# Patient Record
Sex: MALE | Race: WHITE | NOT HISPANIC OR LATINO | Employment: FULL TIME | URBAN - METROPOLITAN AREA
[De-identification: names, ages, dates, MRNs, and addresses within clinical notes are randomized per-mention and may not be internally consistent; named-entity substitution may affect disease eponyms.]

---

## 2019-07-21 ENCOUNTER — HOSPITAL ENCOUNTER (EMERGENCY)
Facility: HOSPITAL | Age: 29
Discharge: HOME | End: 2019-07-21
Attending: EMERGENCY MEDICINE

## 2019-07-21 VITALS
HEART RATE: 73 BPM | DIASTOLIC BLOOD PRESSURE: 61 MMHG | TEMPERATURE: 98 F | OXYGEN SATURATION: 99 % | HEIGHT: 68 IN | RESPIRATION RATE: 19 BRPM | SYSTOLIC BLOOD PRESSURE: 123 MMHG | WEIGHT: 183.88 LBS | BODY MASS INDEX: 27.87 KG/M2

## 2019-07-21 DIAGNOSIS — R25.2 MUSCLE CRAMPS: ICD-10-CM

## 2019-07-21 DIAGNOSIS — T67.8XXA HEAT STRESS SYNDROME, INITIAL ENCOUNTER: Primary | ICD-10-CM

## 2019-07-21 LAB
ALBUMIN SERPL BCP-MCNC: 4.3 G/DL (ref 3.5–5.2)
ALP SERPL-CCNC: 72 U/L (ref 55–135)
ALT SERPL W/O P-5'-P-CCNC: 30 U/L (ref 10–44)
ANION GAP SERPL CALC-SCNC: 11 MMOL/L (ref 8–16)
AST SERPL-CCNC: 47 U/L (ref 10–40)
BASOPHILS # BLD AUTO: 0.04 K/UL (ref 0–0.2)
BASOPHILS NFR BLD: 0.7 % (ref 0–1.9)
BILIRUB SERPL-MCNC: 0.4 MG/DL (ref 0.1–1)
BILIRUB UR QL STRIP: NEGATIVE
BUN SERPL-MCNC: 14 MG/DL (ref 6–20)
CALCIUM SERPL-MCNC: 9.8 MG/DL (ref 8.7–10.5)
CHLORIDE SERPL-SCNC: 103 MMOL/L (ref 95–110)
CK SERPL-CCNC: 1805 U/L (ref 20–200)
CLARITY UR: CLEAR
CO2 SERPL-SCNC: 27 MMOL/L (ref 23–29)
COLOR UR: YELLOW
CREAT SERPL-MCNC: 1.2 MG/DL (ref 0.5–1.4)
DIFFERENTIAL METHOD: ABNORMAL
EOSINOPHIL # BLD AUTO: 0.3 K/UL (ref 0–0.5)
EOSINOPHIL NFR BLD: 4.3 % (ref 0–8)
ERYTHROCYTE [DISTWIDTH] IN BLOOD BY AUTOMATED COUNT: 13.6 % (ref 11.5–14.5)
EST. GFR  (AFRICAN AMERICAN): >60 ML/MIN/1.73 M^2
EST. GFR  (NON AFRICAN AMERICAN): >60 ML/MIN/1.73 M^2
GLUCOSE SERPL-MCNC: 86 MG/DL (ref 70–110)
GLUCOSE UR QL STRIP: NEGATIVE
HCT VFR BLD AUTO: 46 % (ref 40–54)
HGB BLD-MCNC: 16.4 G/DL (ref 14–18)
HGB UR QL STRIP: ABNORMAL
KETONES UR QL STRIP: NEGATIVE
LEUKOCYTE ESTERASE UR QL STRIP: NEGATIVE
LYMPHOCYTES # BLD AUTO: 2.6 K/UL (ref 1–4.8)
LYMPHOCYTES NFR BLD: 44.9 % (ref 18–48)
MAGNESIUM SERPL-MCNC: 2.2 MG/DL (ref 1.6–2.6)
MCH RBC QN AUTO: 29.9 PG (ref 27–31)
MCHC RBC AUTO-ENTMCNC: 35.7 G/DL (ref 32–36)
MCV RBC AUTO: 84 FL (ref 82–98)
MONOCYTES # BLD AUTO: 0.9 K/UL (ref 0.3–1)
MONOCYTES NFR BLD: 16 % (ref 4–15)
NEUTROPHILS # BLD AUTO: 2 K/UL (ref 1.8–7.7)
NEUTROPHILS NFR BLD: 34.3 % (ref 38–73)
NITRITE UR QL STRIP: NEGATIVE
PH UR STRIP: 6 [PH] (ref 5–8)
PLATELET # BLD AUTO: 309 K/UL (ref 150–350)
PMV BLD AUTO: 9.3 FL (ref 9.2–12.9)
POTASSIUM SERPL-SCNC: 4.2 MMOL/L (ref 3.5–5.1)
PROT SERPL-MCNC: 7.9 G/DL (ref 6–8.4)
PROT UR QL STRIP: NEGATIVE
RBC # BLD AUTO: 5.48 M/UL (ref 4.6–6.2)
SODIUM SERPL-SCNC: 141 MMOL/L (ref 136–145)
SP GR UR STRIP: 1.02 (ref 1–1.03)
URN SPEC COLLECT METH UR: ABNORMAL
UROBILINOGEN UR STRIP-ACNC: NEGATIVE EU/DL
WBC # BLD AUTO: 5.75 K/UL (ref 3.9–12.7)

## 2019-07-21 PROCEDURE — 96361 HYDRATE IV INFUSION ADD-ON: CPT

## 2019-07-21 PROCEDURE — 25000003 PHARM REV CODE 250: Performed by: EMERGENCY MEDICINE

## 2019-07-21 PROCEDURE — 83735 ASSAY OF MAGNESIUM: CPT

## 2019-07-21 PROCEDURE — 85025 COMPLETE CBC W/AUTO DIFF WBC: CPT

## 2019-07-21 PROCEDURE — 80053 COMPREHEN METABOLIC PANEL: CPT

## 2019-07-21 PROCEDURE — 81003 URINALYSIS AUTO W/O SCOPE: CPT

## 2019-07-21 PROCEDURE — 99284 EMERGENCY DEPT VISIT MOD MDM: CPT | Mod: 25

## 2019-07-21 PROCEDURE — 82550 ASSAY OF CK (CPK): CPT

## 2019-07-21 PROCEDURE — 96360 HYDRATION IV INFUSION INIT: CPT

## 2019-07-21 RX ORDER — DIAZEPAM 5 MG/1
5 TABLET ORAL
Status: COMPLETED | OUTPATIENT
Start: 2019-07-21 | End: 2019-07-21

## 2019-07-21 RX ADMIN — SODIUM CHLORIDE 1000 ML: 0.9 INJECTION, SOLUTION INTRAVENOUS at 06:07

## 2019-07-21 RX ADMIN — DIAZEPAM 5 MG: 5 TABLET ORAL at 05:07

## 2019-07-21 RX ADMIN — SODIUM CHLORIDE 1000 ML: 0.9 INJECTION, SOLUTION INTRAVENOUS at 05:07

## 2019-07-21 NOTE — ED PROVIDER NOTES
"SCRIBE #1 NOTE: I, Baabr Franklin, am scribing for, and in the presence of, Haroon Aquino Jr., MD. I have scribed the entire note.       History     Chief Complaint   Patient presents with    Spasms     developed after cutting grass yesterday to shoulders, chest, legs and thighs.     Review of patient's allergies indicates:  No Known Allergies      History of Present Illness     HPI    7/21/2019, 4:47 PM  History obtained from the patient      History of Present Illness: Carmine Guzman IV is a 28 y.o. male patient who presents to the Emergency Department for evaluation of generalized "cramping" myalgias which onset gradually yesterday after working outside. Symptoms are constant and moderate in severity. No mitigating or exacerbating factors reported. No associated sxs reported. Patient denies any fever, chills, n/v, dysuria, abd pain, CP, SOB, and all other sxs at this time. No prior Tx reported. No further complaints or concerns at this time.         Arrival mode: Personal vehicle    PCP: Primary Doctor No        Past Medical History:  History reviewed. No pertinent past medical history.    Past Surgical History:  Past Surgical History:   Procedure Laterality Date    KNEE SURGERY Right 2011         Family History:  History reviewed. No pertinent family history.    Social History:  Social History     Tobacco Use    Smoking status: Current Every Day Smoker     Packs/day: 1.00     Types: Cigarettes   Substance and Sexual Activity    Alcohol use: Not Currently    Drug use: Never    Sexual activity: unknown        Review of Systems     Review of Systems   Constitutional: Negative for chills and fever.   HENT: Negative for sore throat.    Respiratory: Negative for shortness of breath.    Cardiovascular: Negative for chest pain.   Gastrointestinal: Negative for abdominal pain, nausea and vomiting.   Genitourinary: Negative for dysuria.   Musculoskeletal: Positive for myalgias ("cramping", generalized). Negative " "for back pain.   Skin: Negative for rash.   Neurological: Negative for weakness.   Hematological: Does not bruise/bleed easily.   All other systems reviewed and are negative.       Physical Exam     Initial Vitals [07/21/19 1628]   BP Pulse Resp Temp SpO2   126/75 90 16 98 °F (36.7 °C) 98 %      MAP       --          Physical Exam  Nursing Notes and Vital Signs Reviewed.  Constitutional: Patient is in no acute distress. Well-developed and well-nourished.  Head: Atraumatic. Normocephalic.  Eyes: PERRL. EOM intact. Conjunctivae are not pale. No scleral icterus.  ENT: Mucous membranes are dry. Oropharynx is clear and symmetric.    Neck: Supple. Full ROM. No lymphadenopathy.  Cardiovascular: Regular rate. Regular rhythm. No murmurs, rubs, or gallops. Distal pulses are 2+ and symmetric.  Pulmonary/Chest: No respiratory distress. Clear to auscultation bilaterally. No wheezing or rales.  Abdominal: Soft and non-distended.  There is no tenderness.  No rebound, guarding, or rigidity. Good bowel sounds.  Genitourinary: No CVA tenderness  Musculoskeletal: Moves all extremities. No obvious deformities. No edema. No calf tenderness. Diffuse muscle cramps.  Skin: Warm and dry.  Neurological:  Alert, awake, and appropriate.  Normal speech.  No acute focal neurological deficits are appreciated.  Psychiatric: Normal affect. Good eye contact. Appropriate in content.     ED Course   Procedures  ED Vital Signs:  Vitals:    07/21/19 1628 07/21/19 1654 07/21/19 1702 07/21/19 1703   BP: 126/75  129/67    Pulse: 90 69  68   Resp: 16   18   Temp: 98 °F (36.7 °C)      TempSrc: Oral      SpO2: 98%   100%   Weight: 83.4 kg (183 lb 13.8 oz)      Height: 5' 8" (1.727 m)       07/21/19 1802   BP: 126/66   Pulse: 80   Resp: 20   Temp:    TempSrc:    SpO2: 99%   Weight:    Height:        Abnormal Lab Results:  Labs Reviewed   CBC W/ AUTO DIFFERENTIAL - Abnormal; Notable for the following components:       Result Value    Gran% 34.3 (*)     Mono% " 16.0 (*)     All other components within normal limits   COMPREHENSIVE METABOLIC PANEL - Abnormal; Notable for the following components:    AST 47 (*)     All other components within normal limits   URINALYSIS, REFLEX TO URINE CULTURE - Abnormal; Notable for the following components:    Occult Blood UA Trace (*)     All other components within normal limits    Narrative:     Preferred Collection Type->Urine, Clean Catch   CK - Abnormal; Notable for the following components:    CPK 1805 (*)     All other components within normal limits   MAGNESIUM        All Lab Results:  Results for orders placed or performed during the hospital encounter of 07/21/19   CBC auto differential   Result Value Ref Range    WBC 5.75 3.90 - 12.70 K/uL    RBC 5.48 4.60 - 6.20 M/uL    Hemoglobin 16.4 14.0 - 18.0 g/dL    Hematocrit 46.0 40.0 - 54.0 %    Mean Corpuscular Volume 84 82 - 98 fL    Mean Corpuscular Hemoglobin 29.9 27.0 - 31.0 pg    Mean Corpuscular Hemoglobin Conc 35.7 32.0 - 36.0 g/dL    RDW 13.6 11.5 - 14.5 %    Platelets 309 150 - 350 K/uL    MPV 9.3 9.2 - 12.9 fL    Gran # (ANC) 2.0 1.8 - 7.7 K/uL    Lymph # 2.6 1.0 - 4.8 K/uL    Mono # 0.9 0.3 - 1.0 K/uL    Eos # 0.3 0.0 - 0.5 K/uL    Baso # 0.04 0.00 - 0.20 K/uL    Gran% 34.3 (L) 38.0 - 73.0 %    Lymph% 44.9 18.0 - 48.0 %    Mono% 16.0 (H) 4.0 - 15.0 %    Eosinophil% 4.3 0.0 - 8.0 %    Basophil% 0.7 0.0 - 1.9 %    Differential Method Automated    Comprehensive metabolic panel   Result Value Ref Range    Sodium 141 136 - 145 mmol/L    Potassium 4.2 3.5 - 5.1 mmol/L    Chloride 103 95 - 110 mmol/L    CO2 27 23 - 29 mmol/L    Glucose 86 70 - 110 mg/dL    BUN, Bld 14 6 - 20 mg/dL    Creatinine 1.2 0.5 - 1.4 mg/dL    Calcium 9.8 8.7 - 10.5 mg/dL    Total Protein 7.9 6.0 - 8.4 g/dL    Albumin 4.3 3.5 - 5.2 g/dL    Total Bilirubin 0.4 0.1 - 1.0 mg/dL    Alkaline Phosphatase 72 55 - 135 U/L    AST 47 (H) 10 - 40 U/L    ALT 30 10 - 44 U/L    Anion Gap 11 8 - 16 mmol/L    eGFR if  African American >60 >60 mL/min/1.73 m^2    eGFR if non African American >60 >60 mL/min/1.73 m^2   Urinalysis, Reflex to Urine Culture Urine, Clean Catch   Result Value Ref Range    Specimen UA Urine, Clean Catch     Color, UA Yellow Yellow, Straw, Angella    Appearance, UA Clear Clear    pH, UA 6.0 5.0 - 8.0    Specific Gravity, UA 1.020 1.005 - 1.030    Protein, UA Negative Negative    Glucose, UA Negative Negative    Ketones, UA Negative Negative    Bilirubin (UA) Negative Negative    Occult Blood UA Trace (A) Negative    Nitrite, UA Negative Negative    Urobilinogen, UA Negative <2.0 EU/dL    Leukocytes, UA Negative Negative   Magnesium   Result Value Ref Range    Magnesium 2.2 1.6 - 2.6 mg/dL   CPK   Result Value Ref Range    CPK 1805 (H) 20 - 200 U/L         Imaging Results:  Imaging Results    None               The Emergency Provider reviewed the vital signs and test results, which are outlined above.     ED Discussion          6:34 PM: Reassessed pt at this time.  Pt states his condition has improved at this time. Discussed with pt all pertinent ED information and results. Discussed pt dx and plan of tx. Gave pt all f/u and return to the ED instructions. All questions and concerns were addressed at this time. Pt expresses understanding of information and instructions, and is comfortable with plan to discharge. Pt is stable for discharge.    I discussed with patient and/or family/caretaker that evaluation in the ED does not suggest any emergent or life threatening medical conditions requiring immediate intervention beyond what was provided in the ED, and I believe patient is safe for discharge.  Regardless, an unremarkable evaluation in the ED does not preclude the development or presence of a serious of life threatening condition. As such, patient was instructed to return immediately for any worsening or change in current symptoms.      ED Medication(s):  Medications   sodium chloride 0.9% bolus 1,000 mL  (1,000 mLs Intravenous New Bag 7/21/19 1808)   diazePAM tablet 5 mg (5 mg Oral Given 7/21/19 1700)   sodium chloride 0.9% bolus 1,000 mL (0 mLs Intravenous Stopped 7/21/19 1807)   sodium chloride 0.9% bolus 1,000 mL (0 mLs Intravenous Stopped 7/21/19 1808)       New Prescriptions    No medications on file        Medication List      You have not been prescribed any medications.           Follow-up Information     [facility]. Call in 1 day.    Why:  as needed to schedule appt for recheck and to set up primary care  Contact information:  [address] [facility] [id number] [phone]             [facility] - .    Specialty:  Emergency Medicine  Why:  If symptoms worsen if muscle cramps return worsen  Contact information:  [address] [facility] [address] [phone]                       Medical Decision Making:   Clinical Tests:   Lab Tests: Ordered and Reviewed             Scribe Attestation:   Scribe #1: I performed the above scribed service and the documentation accurately describes the services I performed. I attest to the accuracy of the note.     Attending:   Physician Attestation Statement for Scribe #1: I, Haroon Aquino Jr., MD, personally performed the services described in this documentation, as scribed by Babar Reid, in my presence, and it is both accurate and complete.           Clinical Impression       ICD-10-CM ICD-9-CM   1. Heat stress syndrome, initial encounter T67.8XXA 992.8   2. Muscle cramps R25.2 729.82       Disposition:   Disposition: Discharged  Condition: Stable         Haroon Aquino Jr., MD  07/21/19 2008

## 2020-06-16 ENCOUNTER — HOSPITAL ENCOUNTER (EMERGENCY)
Facility: HOSPITAL | Age: 30
Discharge: HOME | End: 2020-06-16
Attending: EMERGENCY MEDICINE

## 2020-06-16 VITALS
RESPIRATION RATE: 20 BRPM | TEMPERATURE: 99 F | HEIGHT: 68 IN | OXYGEN SATURATION: 100 % | SYSTOLIC BLOOD PRESSURE: 130 MMHG | DIASTOLIC BLOOD PRESSURE: 82 MMHG | HEART RATE: 75 BPM | WEIGHT: 167.75 LBS | BODY MASS INDEX: 25.42 KG/M2

## 2020-06-16 DIAGNOSIS — R06.02 SHORTNESS OF BREATH: ICD-10-CM

## 2020-06-16 DIAGNOSIS — S20.211A CONTUSION OF RIB ON RIGHT SIDE, INITIAL ENCOUNTER: Primary | ICD-10-CM
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
## 2020-06-16 PROCEDURE — 99284 EMERGENCY DEPT VISIT MOD MDM: CPT | Mod: 25

## 2020-06-16 PROCEDURE — 25000003 PHARM REV CODE 250: Performed by: REGISTERED NURSE

## 2020-06-16 RX ORDER — HYDROCODONE BITARTRATE AND ACETAMINOPHEN 10; 325 MG/1; MG/1
1 TABLET ORAL
Status: COMPLETED | OUTPATIENT
Start: 2020-06-16 | End: 2020-06-16

## 2020-06-16 RX ORDER — TRAMADOL HYDROCHLORIDE 50 MG/1
50 TABLET ORAL EVERY 6 HOURS PRN
Qty: 12 TABLET | Refills: 0 | Status: SHIPPED | OUTPATIENT
Start: 2020-06-16

## 2020-06-16 RX ORDER — IBUPROFEN 800 MG/1
800 TABLET ORAL EVERY 6 HOURS PRN
Qty: 20 TABLET | Refills: 0 | Status: SHIPPED | OUTPATIENT
Start: 2020-06-16

## 2020-06-16 RX ADMIN — HYDROCODONE BITARTRATE AND ACETAMINOPHEN 1 TABLET: 10; 325 TABLET ORAL at 12:06

## 2020-06-16 NOTE — Clinical Note
Carmine Guzman was seen and treated in our emergency department on 6/16/2020.  He may return to work on 06/19/2020.       If you have any questions or concerns, please don't hesitate to call.      Haroon Mariano Jr., FNP

## 2020-06-16 NOTE — ED PROVIDER NOTES
"Encounter Date: 6/16/2020       History     Chief Complaint   Patient presents with    Chest Pain     Pt states, "I fell off a dumpster at work and I hurt the right side of my ribs."     The history is provided by the patient.   Fall off dumpster 2 days ago while working and hit R ribs. Pt states pain has gotten worse and he is having a hard time eating and taking a deep breath    Review of patient's allergies indicates:  No Known Allergies  History reviewed. No pertinent past medical history.  Past Surgical History:   Procedure Laterality Date    KNEE SURGERY Right 2011     History reviewed. No pertinent family history.  Social History     Tobacco Use    Smoking status: Current Every Day Smoker     Packs/day: 1.00     Types: Cigarettes    Smokeless tobacco: Current User   Substance Use Topics    Alcohol use: Not Currently    Drug use: Never     Review of Systems   Constitutional: Negative for fever.   HENT: Negative for sore throat.    Respiratory: Positive for shortness of breath.    Cardiovascular: Negative for chest pain.   Gastrointestinal: Negative for nausea.   Genitourinary: Negative for dysuria.   Musculoskeletal: Negative for back pain.        + R rib pain   Skin: Negative for rash.   Neurological: Negative for weakness.   Hematological: Does not bruise/bleed easily.   All other systems reviewed and are negative.      Physical Exam     Initial Vitals [06/16/20 1141]   BP Pulse Resp Temp SpO2   130/82 75 20 98.5 °F (36.9 °C) 100 %      MAP       --         Physical Exam    Constitutional: He appears well-developed and well-nourished. No distress.   HENT:   Head: Normocephalic and atraumatic.   Nose: Nose normal.   Mouth/Throat: Uvula is midline and oropharynx is clear and moist.   Eyes: Conjunctivae and EOM are normal. Pupils are equal, round, and reactive to light.   Neck: Normal range of motion. Neck supple.   Cardiovascular: Normal rate and regular rhythm.   Pulmonary/Chest: Effort normal and " breath sounds normal. No respiratory distress. He has no decreased breath sounds. He has no wheezes. He has no rales.         He exhibits tenderness.   Abdominal: Soft. Normal appearance and bowel sounds are normal. There is no abdominal tenderness.   Musculoskeletal: Normal range of motion.   Neurological: He is alert and oriented to person, place, and time. He has normal strength. GCS eye subscore is 4. GCS verbal subscore is 5. GCS motor subscore is 6.   Skin: Skin is warm and dry. Capillary refill takes less than 2 seconds. No rash noted.   Psychiatric: He has a normal mood and affect. His speech is normal and behavior is normal.         ED Course   Procedures  Labs Reviewed - No data to display       Imaging Results          X-Ray Ribs 2 View Right (Final result)  Result time 06/16/20 12:09:27    Final result by Lobo Foy MD (06/16/20 12:09:27)                 Impression:      Negative exam.      Electronically signed by: Lobo Foy  Date:    06/16/2020  Time:    12:09             Narrative:    EXAMINATION:  XR RIBS 2 VIEW RIGHT    CLINICAL HISTORY:  Unspecified fall, initial encounter    TECHNIQUE:  Multiple views of the ribs were performed.    COMPARISON:  None.    FINDINGS:  No evidence of displaced rib fracture.  No pneumothorax or pleural fluid.  No acute abnormality identified.                               X-Ray Chest 1 View (Final result)  Result time 06/16/20 12:07:56    Final result by Isaak Mohr MD (06/16/20 12:07:56)                 Impression:      No acute process seen.      Electronically signed by: Isaak Mohr MD  Date:    06/16/2020  Time:    12:07             Narrative:    EXAMINATION:  XR CHEST 1 VIEW    CLINICAL HISTORY:  Shortness of breath    FINDINGS:  Single view of the chest.  Comparison 03/27/2011    Cardiac silhouette is normal.  The lungs demonstrate no evidence of active disease.  No evidence of pleural effusion or pneumothorax.  Bones appear intact.                                                                  Clinical Impression:       ICD-10-CM ICD-9-CM   1. Contusion of rib on right side, initial encounter  S20.211A 922.1   2. Fall  W19.XXXA E888.9   3. Shortness of breath  R06.02 786.05             ED Disposition Condition    Discharge Stable        ED Prescriptions     Medication Sig Dispense Start Date End Date Auth. Provider    traMADoL (ULTRAM) 50 mg tablet Take 1 tablet (50 mg total) by mouth every 6 (six) hours as needed. 12 tablet 6/16/2020  TAMARA Mosher Jr.    ibuprofen (ADVIL,MOTRIN) 800 MG tablet Take 1 tablet (800 mg total) by mouth every 6 (six) hours as needed for Pain. 20 tablet 6/16/2020  TAMARA Mosher Jr.        Follow-up Information     Follow up With Specialties Details Why Contact Info    [facility] -  Emergency Medicine  If symptoms worsen [address]  [facility] [address] [phone]                                     TAMARA Mosher Jr.  06/16/20 1949